# Patient Record
Sex: FEMALE
[De-identification: names, ages, dates, MRNs, and addresses within clinical notes are randomized per-mention and may not be internally consistent; named-entity substitution may affect disease eponyms.]

---

## 2018-01-11 ENCOUNTER — HOSPITAL ENCOUNTER (OUTPATIENT)
Dept: HOSPITAL 90 - RAH | Age: 68
Discharge: HOME | End: 2018-01-11
Attending: INTERNAL MEDICINE
Payer: COMMERCIAL

## 2018-01-11 DIAGNOSIS — N63.20: Primary | ICD-10-CM

## 2018-01-11 PROCEDURE — 76641 ULTRASOUND BREAST COMPLETE: CPT

## 2018-01-11 PROCEDURE — 77065 DX MAMMO INCL CAD UNI: CPT

## 2019-06-18 ENCOUNTER — HOSPITAL ENCOUNTER (OUTPATIENT)
Dept: HOSPITAL 90 - RAH | Age: 69
Discharge: HOME | End: 2019-06-18
Attending: INTERNAL MEDICINE
Payer: COMMERCIAL

## 2019-06-18 DIAGNOSIS — N63.21: ICD-10-CM

## 2019-06-18 DIAGNOSIS — N63.11: Primary | ICD-10-CM

## 2019-06-18 PROCEDURE — 76641 ULTRASOUND BREAST COMPLETE: CPT

## 2019-06-18 PROCEDURE — 77066 DX MAMMO INCL CAD BI: CPT

## 2019-07-19 ENCOUNTER — HOSPITAL ENCOUNTER (OUTPATIENT)
Dept: HOSPITAL 90 - RAH | Age: 69
Discharge: HOME | End: 2019-07-19
Attending: INTERNAL MEDICINE
Payer: COMMERCIAL

## 2019-07-19 DIAGNOSIS — N60.32: Primary | ICD-10-CM

## 2019-07-19 PROCEDURE — 19083 BX BREAST 1ST LESION US IMAG: CPT

## 2019-07-19 PROCEDURE — 88305 TISSUE EXAM BY PATHOLOGIST: CPT

## 2019-07-19 PROCEDURE — 76942 ECHO GUIDE FOR BIOPSY: CPT

## 2019-07-19 NOTE — NUR
U/S GD LT BREAST  BX

PROCEDURE PERFORMED BY DR CLARE QUINTEROS.  PUNCTURE SITE LT BREAST  AND PATIENT TOLERATED 
PROCEDURE WELL.  SPECIMEN X 6 COLLECTED AND SENT TO LAB.  TISSUE MARKER DEPLOYED TO BX SITE. 
 END OF PROCEDURE AT  0940.  BIOPSY NEEDLE REMOVED AND DRESSING APPLIED.  NO BLEEDING NOTED. 
 DISCHARGE INSTRUCTIONS GIVEN TO PATIENT AND VERBALIZED UNDERSTANDING. DISCHARGED VIA 
AMBULATION AT 1015.  AAO X3 WITH NO C/O PAIN.

## 2021-07-16 ENCOUNTER — HOSPITAL ENCOUNTER (OUTPATIENT)
Dept: HOSPITAL 90 - RAH | Age: 71
Discharge: HOME | End: 2021-07-16
Attending: INTERNAL MEDICINE
Payer: COMMERCIAL

## 2021-07-16 DIAGNOSIS — Z12.31: Primary | ICD-10-CM

## 2021-07-16 PROCEDURE — 77067 SCR MAMMO BI INCL CAD: CPT
